# Patient Record
Sex: MALE | Race: OTHER | URBAN - METROPOLITAN AREA
[De-identification: names, ages, dates, MRNs, and addresses within clinical notes are randomized per-mention and may not be internally consistent; named-entity substitution may affect disease eponyms.]

---

## 2018-07-05 ENCOUNTER — OFFICE VISIT (OUTPATIENT)
Dept: FAMILY MEDICINE CLINIC | Age: 28
End: 2018-07-05

## 2018-07-05 VITALS
HEIGHT: 69 IN | DIASTOLIC BLOOD PRESSURE: 81 MMHG | OXYGEN SATURATION: 98 % | TEMPERATURE: 98.6 F | WEIGHT: 221 LBS | BODY MASS INDEX: 32.73 KG/M2 | SYSTOLIC BLOOD PRESSURE: 131 MMHG | HEART RATE: 81 BPM

## 2018-07-05 DIAGNOSIS — J45.30 MILD PERSISTENT ASTHMA WITHOUT COMPLICATION: Primary | ICD-10-CM

## 2018-07-05 RX ORDER — ALBUTEROL SULFATE 90 UG/1
2 AEROSOL, METERED RESPIRATORY (INHALATION)
Qty: 1 INHALER | Refills: 1 | Status: SHIPPED | OUTPATIENT
Start: 2018-07-05

## 2018-07-05 NOTE — PROGRESS NOTES
Hoda Puente is a 33 y.o. male    Issues discussed today include:    Chief Complaint   Patient presents with    Shortness of Breath     Pt states he needs a refill on asthma medication. 1) Asthma:  Was diagnosed with asthma at age of 10 months old. Has been hospitalized just once for his asthma. Never been intubated. Has albuterol, uses inhaler about 3 times weekly. Wakes up about 5-6 times per month with asthma sxs. Feels his daily activity is sometimes limited by his asthma sxs. Never been on any other medications for asthma, no controller. No SOB, cough, wheezing today. No h/o seasonal allergies. Data reviewed or ordered today:       Other problems include: There is no problem list on file for this patient. Medications:  No current outpatient prescriptions on file prior to visit. No current facility-administered medications on file prior to visit. Allergies:  No Known Allergies    LMP:  No LMP for male patient. Social History     Social History    Marital status:      Spouse name: N/A    Number of children: N/A    Years of education: N/A     Occupational History    Not on file. Social History Main Topics    Smoking status: Never Smoker    Smokeless tobacco: Never Used    Alcohol use Not on file    Drug use: Not on file    Sexual activity: Not on file     Other Topics Concern    Not on file     Social History Narrative    No narrative on file       No family history on file.       Physical Exam   Visit Vitals    /81 (BP 1 Location: Right arm)    Pulse 81    Temp 98.6 °F (37 °C) (Oral)    Ht 5' 8.9\" (1.75 m)    Wt 221 lb (100.2 kg)    SpO2 98%    BMI 32.73 kg/m2      BP Readings from Last 3 Encounters:   07/05/18 131/81     Constitutional: Appears well,  No acute distress, Vitals noted  Psychiatric:  Affect normal, Alert and Oriented to person/place/time  Eyes:  Conjunctiva clear, no drainage  ENT:  External ears and nose normal, Mucous membranes moist  Neck:  General inspection normal. Supple. Heart:  Normal HR, Normal S1 and S2,  Regular rhythm. No murmurs, rubs or gallops. Lungs:  Clear to auscultation, good respiratory effort, no wheezes, rales or rhonchi  Skin:  Warm to palpation, without rashes      Assessment/Plan:      ICD-10-CM ICD-9-CM    1. Mild persistent asthma without complication T41.97 730.35 albuterol (PROVENTIL HFA, VENTOLIN HFA, PROAIR HFA) 90 mcg/actuation inhaler     Pt seems to have mild persistent asthma based on day and night time sxs, mild activity limitation. Would benefit from low dose steroid inhaler.  - Refill sent for albuterol today  - Pt to f/u with SW for crossover pharmacy application, then can send controller med and additional albuterol  - Not in exacerbation today, told to RTC or go to ER if experiencing worsening sxs      Follow-up Disposition:  Return for Needs SW appt asap for crossover application; Provider f/u in 3 months.         Alejandra Briseno MD  [address] [place] [address]

## 2018-07-05 NOTE — PATIENT INSTRUCTIONS
Ataque de asma: Instrucciones de cuidado - [ Asthma Attack: Care Instructions ]  Instrucciones de cuidado    [place] un ataque de asma, las vías respiratorias se hinchan y se estrechan. [place] dificulta la respiración. Los ataques de asma graves pueden ser mortales, basia usted puede ayudar a prevenirlos controlando el asma y tratando los síntomas antes de que empeoren. Los síntomas incluyen falta de aire, opresión en el pecho, tos y respiración sibilante (con silbidos). Gerardo Gunter a estos síntomas y [address] ayudarle a evitar futuras visitas a la ignacia de urgencias. El médico lo marcial revisado minuciosamente, basia pueden presentarse problemas más tarde. Si nota algún problema o síntomas nuevos, busque tratamiento médico inmediatamente. La atención de seguimiento es faustina parte clave de calzada tratamiento y seguridad. Asegúrese de hacer y acudir a todas las citas, y llame a calzada médico si está teniendo problemas. También es faustina buena idea saber los resultados de los exámenes y mantener faustina lista de los medicamentos que erin. Cómo puede cuidarse en el hogar? · Siga calzada plan de acción para el asma para prevenir y tratar los ataques. Si no tiene un plan de acción para el asma, colabore con calzada médico para elaborar negin. · [employer] medicamentos para el asma exactamente bharathi le fueron recetados. Dígale a calzada médico de inmediato cualquier adelita que tenga sobre cómo tomarlos. ¨ Use calzada medicamento de alivio rápido cuando tenga síntomas de un ataque. El medicamento de alivio rápido suele ser un inhalador de albuterol. Algunas personas necesitan usar un medicamento de alivio rápido antes de hacer ejercicio. ¨ [place] calzada medicamento de control todos los días, no solo cuando tenga síntomas. Por lo general, el medicamento de control es un corticosteroide inhalado. El objetivo es prevenir los problemas antes de que ocurran. No tome calzada medicamento de control para tratar un ataque que ya ha empezado.  No actúa con la suficiente rapidez para ayudarle. ¨ Si calzada médico le recetó pastillas de corticosteroides para usar berkley un ataque, tómelas exactamente bharathi se las recetó. Pueden pasar horas [employer] las pastillas empiecen a actuar, basia pueden acortar el episodio y ayudarle a respirar mejor. ¨ Lleve calzada medicamento de alivio rápido siempre con usted. · Hable con calzada médico antes de berny otros medicamentos. Algunos medicamentos, bharathi la aspirina, pueden causar ataques de asma en [employer]. · Si tiene un medidor de flujo armani, úselo para revisar lo raulito que está respirando. [place] puede ayudarle a predecir cuándo va a ocurrir un ataque de asma. Entonces puede berny [employer] para prevenir el ataque o hacerlo menos grave. · No fume ni permita que otros fumen cerca de usted. Evite los lugares con humo. Fumar empeora el asma. Si necesita ayuda para dejar de fumar, hable con calzada médico sobre programas y medicamentos para dejar de fumar. Estos pueden aumentar cheli probabilidades de dejar el hábito para siempre. · [place] qué factores desencadenan cheli ataques de asma y, cuando pueda, evítelos. Los factores desencadenantes comunes [place] resfriados, el humo, la contaminación del aire, el polvo, el polen, el moho, las [place], las [place], el estrés y el aire frío. · Evite los resfriados y la gripe. Hágase poner la vacuna antineumocócica. Si ya le conde puesto faustina antes, consulte a calzada médico para saber si necesita faustina segunda dosis. Hágase poner la vacuna contra la gripe cada otoño. Si tiene que estar cerca de personas con resfriados o gripe, lávese las enio con frecuencia. Cuándo debe pedir ayuda? Llame al 911 en cualquier momento que considere que necesita atención de [place]. Por ejemplo, llame si:  ? · Tiene grave dificultad para respirar. ? Llame a calzada médico ahora mismo o busque atención médica inmediata si:  ? · Cheli síntomas no mejoran después de seguir el plan de acción para el asma.    ? · Tiene nueva o peor dificultad para respirar. ? · La tos y la respiración sibilante (con silbidos) Marzetta Finger. ? · Al toser elimina mucosidad (esputo) amarronada oscura o con missael. ? · Tiene fiebre nueva o más sergio. ?Preste especial atención a los cambios en calzada daryl y asegúrese de comunicarse con calzada médico si:  ? · Necesita usar el medicamento de alivio rápido [id number] [facility] de 2 días a la semana (a menos que sea solo para hacer ejercicio). ? · Calzada tos es más profunda o más frecuente que antes, especialmente si nota más mucosidad o un cambio en el color de la mucosidad. ? · No mejora bharathi se esperaba. Dónde puede encontrar más información en inglés? Mary Glee a [email or web address] Juan Alexis K233 en la búsqueda para aprender más acerca de \"Ataque de asma: Instrucciones de cuidado - [ Asthma Attack: Care Instructions ]. \"  Revisado: 12 browning, 2017  Versión del contenido: 11.4  © 9926-1530 Healthwise, Incorporated. Las instrucciones de cuidado fueron adaptadas bajo licencia por [employer] (which disclaims liability or warranty for this information). Si usted tiene [place] afección médica o sobre estas instrucciones, siempre pregunte a calzada profesional de daryl. Healthwise, Incorporated niega toda garantía o responsabilidad por calzada uso de esta información.

## 2018-07-05 NOTE — MR AVS SNAPSHOT
[address] [address] [address] Millie Carpenter 13 
[phone] Patient: Hoda Puente MRN: [id number] :1990 Visit Information Tiffani Johnson y Yoan Personal Médico Departamento Teléfono del Dep. Número de visita 2018  3:00 PM Nilsa Hull MD [address] [phone] Follow-up Instructions Return for Needs SW appt asap for crossover application; Provider f/u in 3 months. Upcoming Health Maintenance Date Due DTaP/Tdap/Td series (1 - Tdap) 10/28/2012 Influenza Age 5 to Adult 2018 Jaxson Sol A partir del:  2018 No Known Allergies [facility] [place] [place] No hay ninguna vacuna archivada. No revisadas esta visita Partes vitales PS Pulso Temperatura [place] ( percentil de crecimiento) Peso (percentil de crecimiento) SpO2  
 131/81 (BP 1 Location: Right arm) 81 98.6 °F (37 °C) (Oral) 5' 8.9\" (1.75 m) 221 lb (100.2 kg) 98% BMI ([address]) Estatus de tabaquísmo 32.73 kg/m2 Never Smoker Historial de signos vitales BMI and BSA Data Body Mass Index Body Surface Area 32.73 kg/m 2 2.21 m 2 Jordan lista de medicamentos actualizada [facility] actualizada 18  5:13 PM.  Isabel Noah use jordan lista de medicamentos más reciente. albuterol 90 mcg/actuation inhaler También conocido bharathi:  PROVENTIL HFA, VENTOLIN HFA, PROAIR HFA Take 2 Puffs by inhalation every four (4) hours as needed for Wheezing. [place] 2 puffs por inhalacion cada 4 horas si necesita para sintomas de asthma Impresion de recetas Refills  
 albuterol (PROVENTIL HFA, VENTOLIN HFA, PROAIR HFA) 90 mcg/actuation inhaler 1 Sig: Take 2 Puffs by inhalation every four (4) hours as needed for Wheezing. [place] 2 puffs por inhalacion cada 4 horas si necesita para sintomas de asthma Class: Print Route: Inhalation Instrucciones de seguimiento Return for Needs SW appt asap for crossover application; Provider f/u in 3 months. Instrucciones para el Paciente Ataque de asma: Instrucciones de cuidado - [ Asthma Attack: Care Instructions ] Instrucciones de cuidado [place] un ataque de asma, las vías respiratorias se hinchan y se estrechan. [place] dificulta la respiración. Los ataques de asma graves pueden ser mortales, basia usted puede ayudar a prevenirlos controlando el asma y tratando los síntomas antes de que empeoren. Los síntomas incluyen falta de aire, opresión en el pecho, tos y respiración sibilante (con silbidos). Verneda Valentin a estos síntomas y [address] ayudarle a evitar futuras visitas a la ignacia de urgencias. El médico lo marcial revisado minuciosamente, basia pueden presentarse problemas más tarde. Si nota algún problema o síntomas nuevos, busque tratamiento médico inmediatamente. La atención de seguimiento es faustina parte clave de calzada tratamiento y seguridad. Asegúrese de hacer y acudir a todas las citas, y llame a calzada médico si está teniendo problemas. También es faustina buena idea saber los resultados de los exámenes y mantener faustina lista de los medicamentos que erin. Cómo puede cuidarse en el hogar? · Siga calzada plan de acción para el asma para prevenir y tratar los ataques. Si no tiene un plan de acción para el asma, colabore con calzada médico para elaborar negin. · [employer] medicamentos para el asma exactamente bharathi le fueron recetados. Dígale a calzada médico de inmediato cualquier adelita que tenga sobre cómo tomarlos. ¨ Use calzada medicamento de alivio rápido cuando tenga síntomas de un ataque. El medicamento de alivio rápido suele ser un inhalador de albuterol. Algunas personas necesitan usar un medicamento de alivio rápido antes de hacer ejercicio.  
¨ [place] calzada medicamento de control todos los días, no solo cuando tenga síntomas. Por lo general, el medicamento de control es un corticosteroide inhalado. El objetivo es prevenir los problemas antes de que ocurran. No tome calzada medicamento de control para tratar un ataque que ya ha empezado. No actúa con la suficiente rapidez para ayudarle. ¨ Si calzada médico le recetó pastillas de corticosteroides para usar berkley un ataque, tómelas exactamente bharathi se las recetó. Pueden pasar horas [employer] las pastillas empiecen a actuar, basia pueden acortar el episodio y ayudarle a respirar mejor. ¨ Lleve calzada medicamento de alivio rápido siempre con usted. · Hable con calzada médico antes de berny otros medicamentos. Algunos medicamentos, bharathi la aspirina, pueden causar ataques de asma en [employer]. · Si tiene un medidor de flujo armani, úselo para revisar lo raulito que está respirando. [place] puede ayudarle a predecir cuándo va a ocurrir un ataque de asma. Entonces puede berny [employer] para prevenir el ataque o hacerlo menos grave. · No fume ni permita que otros fumen cerca de usted. Evite los lugares con humo. Fumar empeora el asma. Si necesita ayuda para dejar de fumar, hable con calzada médico sobre programas y medicamentos para dejar de fumar. Estos pueden aumentar malik probabilidades de dejar el hábito para siempre. · [place] qué factores desencadenan malik ataques de asma y, cuando pueda, evítelos. Los factores desencadenantes comunes [place] resfriados, el humo, la contaminación del aire, el polvo, el polen, el moho, las [place], las [place], el estrés y el aire frío. · Evite los resfriados y la gripe. Hágase poner la vacuna antineumocócica. Si ya le conde puesto faustina antes, consulte a calzada médico para saber si necesita faustina segunda dosis. Hágase poner la vacuna contra la gripe cada otoño. Si tiene que estar cerca de personas con resfriados o gripe, lávese las enio con frecuencia. Cuándo debe pedir ayuda?  
Llame al 911 en cualquier momento que considere que necesita atención de urgencia. Por ejemplo, llame si: 
? · Tiene grave dificultad para respirar. ? Llame a jordan médico ahora mismo o busque atención médica inmediata si: 
? · Cheli síntomas no mejoran después de seguir el plan de acción para el asma. ? · Tiene nueva o peor dificultad para respirar. ? · La tos y la respiración sibilante (con silbidos) Faheem Stover. ? · Al toser elimina mucosidad (esputo) amarronada oscura o con missael. ? · Tiene fiebre nueva o más sergio. ?Preste especial atención a los cambios en jordan daryl y asegúrese de comunicarse con jordan médico si: 
? · Necesita usar el medicamento de alivio rápido [id number] [facility] de 2 días a la semana (a menos que sea solo para hacer ejercicio). ? · [place] tos es más profunda o más frecuente que antes, especialmente si nota más mucosidad o un cambio en el color de la mucosidad. ? · No mejora bharathi se esperaba. Dónde puede encontrar más información en inglés? [place] a [email or web address] Chandler Reyes [id number] en la búsqueda para aprender más acerca de \"Ataque de asma: Instrucciones de cuidado - [ Asthma Attack: Care Instructions ]. \" 
Revisado: 12 [place], 2017 Versión del contenido: 11.4 © 8890-5696 Healthwise, Incorporated. Las instrucciones de cuidado fueron adaptadas bajo licencia por [employer] (which disclaims liability or warranty for this information). Si usted tiene [place] afección médica o sobre estas instrucciones, siempre pregunte a jordan profesional de daryl. Healthwise, Incorporated niega toda garantía o responsabilidad por jordan uso de esta información. Introducing [facility]! [facility] introduce portal paciente MyChart . Ahora se puede acceder a partes de jordan expediente médico, enviar por correo electrónico la oficina de jordan médico y solicitar renovaciones de medicamentos en línea. En jordan navegador de Internet , Eleanor Arboleda a [email or web address] Jose clic en el usuario por Madison Mcclain?  Laisha Poplar clic aquí en la [place]. Verá la página de registro [place]. Ingrese calzada código de [employer] gerry y bharathi aparece a continuación. Usted no tendrá que [employer] código después de shanice completado el proceso de registro . Si usted no se inscribe antes de la fecha de caducidad , debe solicitar un nuevo código. · MyChart Código de acceso : [id number] Expires: 10/3/2018  5:08 PM 
 
Ingresa los últimos cuatro dígitos de calzada Número de Seguro Social ( xxxx ) y fecha de nacimiento ( dd / mm / aaaa ) bharathi se indica y jose clic en Enviar. Usted será llevado a la siguiente página de registro . Crear un ID MyChart . Esta será calzada ID de inicio de sesión de MyChart y no puede ser [place] , por lo que pensar en faustina que es [place] y fácil de recordar . Crear faustina contraseña MyChart . Usted puede cambiar calzada contraseña en cualquier momento . Ingrese calzada Password Reset de preguntas y [place] . [place] se puede utilizar en un momento posterior si usted olvida calzada contraseña. Introduzca calzada dirección de correo electrónico . [place] recibirá faustina notificación por correo electrónico cuando la nueva información está disponible en MyChart . Noah valdivia en Registrarse. Yoly Vega jorge y descargar porciones de calzada expediente médico. 
Jose shea en el enlace de descarga del menú Resumen para descargar faustina copia portátil de calzada información médica . Si tiene [employer] , por favor visite la sección de preguntas frecuentes del sitio web MyChart . Recuerde, MyChart NO es que se utilizará para las necesidades urgentes. Para emergencias médicas , llame al 911 . Ahora disponible en calzada iPhone y Android ! Por favor proporcione grecia resumen de la documentación de cuidado a calzada próximo proveedor. If you have any questions after today's visit, please call [phone].

## 2018-07-09 ENCOUNTER — OFFICE VISIT (OUTPATIENT)
Dept: FAMILY MEDICINE CLINIC | Age: 28
End: 2018-07-09

## 2018-07-09 DIAGNOSIS — Z71.89 COUNSELING AND COORDINATION OF CARE: Primary | ICD-10-CM

## 2018-07-11 NOTE — PROGRESS NOTES
Met with patient for [facility] referral. Income verification pending for financial screening.

## 2018-10-02 ENCOUNTER — DOCUMENTATION ONLY (OUTPATIENT)
Dept: FAMILY MEDICINE CLINIC | Age: 28
End: 2018-10-02

## 2018-10-02 NOTE — PROGRESS NOTES
Haven't heard from pt since we met for [facility] Referral on 7/9/19. Referral has been withdrawn.